# Patient Record
Sex: FEMALE | Race: OTHER | NOT HISPANIC OR LATINO
[De-identification: names, ages, dates, MRNs, and addresses within clinical notes are randomized per-mention and may not be internally consistent; named-entity substitution may affect disease eponyms.]

---

## 2020-01-30 ENCOUNTER — TRANSCRIPTION ENCOUNTER (OUTPATIENT)
Age: 50
End: 2020-01-30

## 2020-08-18 ENCOUNTER — OUTPATIENT (OUTPATIENT)
Dept: OUTPATIENT SERVICES | Facility: HOSPITAL | Age: 50
LOS: 1 days | End: 2020-08-18

## 2020-08-18 ENCOUNTER — APPOINTMENT (OUTPATIENT)
Dept: MRI IMAGING | Facility: CLINIC | Age: 50
End: 2020-08-18
Payer: COMMERCIAL

## 2020-08-18 DIAGNOSIS — Z00.8 ENCOUNTER FOR OTHER GENERAL EXAMINATION: ICD-10-CM

## 2020-08-18 PROCEDURE — 72148 MRI LUMBAR SPINE W/O DYE: CPT | Mod: 26

## 2020-12-01 ENCOUNTER — APPOINTMENT (OUTPATIENT)
Dept: ULTRASOUND IMAGING | Facility: CLINIC | Age: 50
End: 2020-12-01

## 2021-01-22 ENCOUNTER — APPOINTMENT (OUTPATIENT)
Dept: ULTRASOUND IMAGING | Facility: CLINIC | Age: 51
End: 2021-01-22

## 2021-01-22 ENCOUNTER — APPOINTMENT (OUTPATIENT)
Dept: MAMMOGRAPHY | Facility: CLINIC | Age: 51
End: 2021-01-22

## 2021-04-27 ENCOUNTER — TRANSCRIPTION ENCOUNTER (OUTPATIENT)
Age: 51
End: 2021-04-27

## 2022-05-05 ENCOUNTER — APPOINTMENT (OUTPATIENT)
Dept: ORTHOPEDIC SURGERY | Facility: CLINIC | Age: 52
End: 2022-05-05
Payer: COMMERCIAL

## 2022-05-05 PROCEDURE — L3960: CPT | Mod: RT

## 2022-05-05 PROCEDURE — 99214 OFFICE O/P EST MOD 30 MIN: CPT

## 2022-05-06 RX ORDER — OXYCODONE AND ACETAMINOPHEN 10; 325 MG/1; MG/1
10-325 TABLET ORAL EVERY 8 HOURS
Qty: 21 | Refills: 0 | Status: ACTIVE | COMMUNITY
Start: 2022-05-06 | End: 1900-01-01

## 2022-05-06 NOTE — REASON FOR VISIT
[FreeTextEntry2] : The patient has right shoulder small full thickness tear of the supraspinatus tendon

## 2022-05-06 NOTE — DISCUSSION/SUMMARY
[de-identified] : The patient has right shoulder small full thickness tear of the supraspinatus tendon.\par \par The patient has an acute and complicated. \par He has a moderate risk of morbidity secondary to pending surgery and narcotic rx.  \par \par The preoperative, intraoperative, and postoperative courses were discussed. \par Patient will need postoperative icing, bracing, and PT. \par He was prescribed Percocet for postop pain. \par Home exercises were discussed.\par The patient was given and fitted for the brace today.\par The patient will follow up 7-10 days from surgery. \par \par \par

## 2022-05-06 NOTE — PHYSICAL EXAM
[Normal Coordination] : normal coordination [Normal Sensation] : normal sensation [Normal Mood and Affect] : normal mood and affect [Orientated] : orientated [Able to Communicate] : able to communicate [Normal Skin] : normal skin [Well Developed] : well developed [Well Nourished] : well nourished [Peripheral vascular exam is grossly normal] : peripheral vascular exam is grossly normal [Right] : right shoulder [Standing] : standing [Moderate] : moderate [4 ___] : forward flexion 4[unfilled]/5 [4___] : external rotation 4[unfilled]/5 [5___] : internal rotation 5[unfilled]/5 [Left] : left shoulder [NL (0-180)] : full active abduction 0-180 degrees [NL (0-70)] : full internal rotation 0-70 degrees [NL (0-90)] : full external rotation 0-90 degrees [] : negative Gaston [TWNoteComboBox7] : active forward flexion 150 degrees [de-identified] : active abduction 90 degrees [TWNoteComboBox6] : internal rotation L4 [de-identified] : external rotation 55 degrees

## 2022-05-06 NOTE — HISTORY OF PRESENT ILLNESS
[de-identified] : The patient is here for a preoperative visit. She is relatively unchanged. PT has been minimally helpful for shoulder pain. She has difficulty performing some of her work related duties. The patient has pain at the anterior lateral shoulder. Her pain when present is intermittent in timing and minor to moderate in severity. The patient reports pain when performing certain ADL's. The patient also has pain when reaching overhead.

## 2022-05-10 RX ORDER — ONDANSETRON 8 MG/1
8 TABLET ORAL
Qty: 6 | Refills: 0 | Status: ACTIVE | COMMUNITY
Start: 2022-05-10 | End: 1900-01-01

## 2022-05-11 ENCOUNTER — APPOINTMENT (OUTPATIENT)
Dept: ORTHOPEDIC SURGERY | Facility: HOSPITAL | Age: 52
End: 2022-05-11
Payer: COMMERCIAL

## 2022-05-11 ENCOUNTER — APPOINTMENT (OUTPATIENT)
Dept: ORTHOPEDIC SURGERY | Facility: HOSPITAL | Age: 52
End: 2022-05-11

## 2022-05-11 ENCOUNTER — RESULT REVIEW (OUTPATIENT)
Age: 52
End: 2022-05-11

## 2022-05-11 PROCEDURE — 29822 SHO ARTHRS SRG LMTD DBRDMT: CPT | Mod: AS,59,RT

## 2022-05-11 PROCEDURE — 29826 SHO ARTHRS SRG DECOMPRESSION: CPT | Mod: RT

## 2022-05-11 PROCEDURE — 29827 SHO ARTHRS SRG RT8TR CUF RPR: CPT | Mod: AS,RT

## 2022-05-11 PROCEDURE — 29827 SHO ARTHRS SRG RT8TR CUF RPR: CPT | Mod: RT

## 2022-05-11 PROCEDURE — 29822 SHO ARTHRS SRG LMTD DBRDMT: CPT | Mod: 59,RT

## 2022-05-11 PROCEDURE — 29826 SHO ARTHRS SRG DECOMPRESSION: CPT | Mod: AS,RT

## 2022-05-12 ENCOUNTER — NON-APPOINTMENT (OUTPATIENT)
Age: 52
End: 2022-05-12

## 2022-05-19 ENCOUNTER — APPOINTMENT (OUTPATIENT)
Dept: ORTHOPEDIC SURGERY | Facility: CLINIC | Age: 52
End: 2022-05-19
Payer: COMMERCIAL

## 2022-05-19 PROCEDURE — 99024 POSTOP FOLLOW-UP VISIT: CPT

## 2022-05-19 NOTE — HISTORY OF PRESENT ILLNESS
[de-identified] : The patient presents in the brace and has been compliant. She reports minimal pain and no need for pain medications. Patient has been doing her HEP for the elbow wrist and hand, as well as pendulums.

## 2022-05-19 NOTE — DISCUSSION/SUMMARY
[de-identified] : The patient is s/p right shoulder arthroscopic rotator cuff repair utilizing one anchor with SAD and debridement of the superior labrum performed on 5/11/2022.\par \par The patient is doing well post operatively.\par She will continue use of the brace until 4 weeks from the date of her surgery.\par She will proceed with HEP and arm pendulums \par The patient will follow up in 3 weeks, XOA of the right shoulder, AP, Scap Y and axillary views. \par She will begin PT at that time \par \par \par I, Dr. Liang Baptiste, attest that this documentation was recorded by the scribe, in my presence, and that it accurately reflects the service I personally performed, and the decisions made by me with my edits as appropriate.\par \par I, Maynor Long, acting as scribe, attest that this documentation has been prepared under the direction and in the presence of Provider Liang Baptiste MD.\par

## 2022-05-19 NOTE — PHYSICAL EXAM
[Right] : right shoulder [de-identified] : Constitutional: The general appearance of the patient is well developed, well nourished, no deformities and well groomed. Normal\par \par  Gait: Gait and function is as follows: normal gait.\par \par  Skin: Head and neck visualized skin is normal. Left upper extremity visualized skin is normal. Right upper extremity visualized skin is normal. \par \par  Cardiovascular: palpable radial pulse bilaterally.\par \par  Neurologic: The patient is oriented to time, place and person. Sensation to light touch intact in the bilateral upper extremities. Mood and Affect is normal.\par  [] : no tenderness at bicipital groove [FreeTextEntry8] : supra not tender to palpation  [de-identified] : not tested due to recent surgery  [de-identified] : active abduction 20 degrees [de-identified] : external rotation 0 degrees

## 2022-05-19 NOTE — REASON FOR VISIT
[FreeTextEntry2] : The patient is s/p right shoulder arthroscopic rotator cuff repair utilizing one anchor with SAD and debridement of the superior labrum performed on 5/11/2022.

## 2022-06-09 ENCOUNTER — APPOINTMENT (OUTPATIENT)
Dept: ORTHOPEDIC SURGERY | Facility: CLINIC | Age: 52
End: 2022-06-09
Payer: COMMERCIAL

## 2022-06-09 PROCEDURE — 73030 X-RAY EXAM OF SHOULDER: CPT | Mod: RT

## 2022-06-09 PROCEDURE — 99024 POSTOP FOLLOW-UP VISIT: CPT

## 2022-06-09 NOTE — PHYSICAL EXAM
[de-identified] : Constitutional: The general appearance of the patient is well developed, well nourished, no deformities and well groomed. Normal\par \par  Gait: Gait and function is as follows: normal gait.\par \par  Skin: Head and neck visualized skin is normal. Left upper extremity visualized skin is normal. Right upper extremity visualized skin is normal. \par \par  Cardiovascular: palpable radial pulse bilaterally.\par \par  Neurologic: The patient is oriented to time, place and person. Sensation to light touch intact in the bilateral upper extremities. Mood and Affect is normal.\par  [] : tenderness at anterior shoulder [Right] : right shoulder [de-identified] : not tested due to recent surgery  [FreeTextEntry1] : Right shoulder: Single RCR anchor intact without movement. No fractures or loose bodies. GH joint is reduced. Adequate SAD. [TWNoteComboBox7] : active forward flexion 90 degrees [de-identified] : active abduction 90 degrees [de-identified] : external rotation 0 degrees

## 2022-06-09 NOTE — HISTORY OF PRESENT ILLNESS
[de-identified] : The patient is s/p 4 weeks from surgery. She is doing well and without pain. She reports discomfort to the lateral shoulder with small GH motion. She can reach to shoulder height in FE and ABD. She will slowly begin to use the arm for ADLs that do not require extensive lifting. She will begin outpatient PT. The patient will d/c the brace at this time as well.

## 2022-06-09 NOTE — DISCUSSION/SUMMARY
[de-identified] : The patient is s/p right shoulder arthroscopic rotator cuff repair utilizing one anchor with SAD and debridement of the superior labrum performed on 5/11/2022.\par \par The patient is doing well post operatively.\par The patient will protect her shoulder at night for one more week.\par She will d/c brace during the day. \par The patient will begin PT and was given a prescription.\par She will follow up in 4 weeks.

## 2022-06-23 ENCOUNTER — APPOINTMENT (OUTPATIENT)
Dept: ORTHOPEDIC SURGERY | Facility: CLINIC | Age: 52
End: 2022-06-23

## 2022-07-07 ENCOUNTER — APPOINTMENT (OUTPATIENT)
Dept: ORTHOPEDIC SURGERY | Facility: CLINIC | Age: 52
End: 2022-07-07

## 2022-07-07 DIAGNOSIS — M75.101 UNSPECIFIED ROTATOR CUFF TEAR OR RUPTURE OF RIGHT SHOULDER, NOT SPECIFIED AS TRAUMATIC: ICD-10-CM

## 2022-07-07 PROCEDURE — 99024 POSTOP FOLLOW-UP VISIT: CPT

## 2022-07-07 NOTE — HISTORY OF PRESENT ILLNESS
[de-identified] : The patient is 2 months post op. She is doing well, still has mild soreness in the shoulder. She is in physical therapy and seeing improvements. They have just started her on isometric exercises. She takes Aleve if needed and has continued with icing the shoulder. She states she is now having left wrist pain from overuse. She has no complaints of radiating pain, numbness, weakness, drainage, fevers, chills, chest pain or shortness of breath.

## 2022-07-07 NOTE — PHYSICAL EXAM
[Normal Coordination] : normal coordination [Normal Sensation] : normal sensation [Normal Mood and Affect] : normal mood and affect [Orientated] : orientated [Able to Communicate] : able to communicate [Normal Skin] : normal skin [No obvious lymphadenopathy in areas examined] : no obvious lymphadenopathy in areas examined [Well Developed] : well developed [Well Nourished] : well nourished [Peripheral vascular exam is grossly normal] : peripheral vascular exam is grossly normal [No Respiratory Distress] : no respiratory distress [Right] : right shoulder [Standing] : standing [4___] : external rotation 4[unfilled]/5 [5___] : internal rotation 5[unfilled]/5 [] : motor and sensory intact distally [FreeTextEntry8] : No tenderness at the AC joint, biceps tendon or supraspinatus\par  [de-identified] : 4 out of 5 supraspinatus [TWNoteComboBox7] : active forward flexion 110 degrees [de-identified] : active abduction 80 degrees [TWNoteComboBox6] : internal rotation sacrum [de-identified] : external rotation 40 degrees

## 2022-07-07 NOTE — DISCUSSION/SUMMARY
[de-identified] : The patient is s/p right shoulder arthroscopic rotator cuff repair utilizing one anchor with SAD and debridement of the superior labrum performed on 5/11/2022. \par \par The patient is doing well.\par \par She can continue with icing and NSAIDs as needed.\par A new script for PT is provided today in the office. 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 \par \par The patient will follow up in 4 weeks for re-evaluation, she may require more physical therapy if not she will be seen back as needed.

## 2022-08-04 ENCOUNTER — APPOINTMENT (OUTPATIENT)
Dept: ORTHOPEDIC SURGERY | Facility: CLINIC | Age: 52
End: 2022-08-04

## 2022-08-04 PROCEDURE — 99024 POSTOP FOLLOW-UP VISIT: CPT

## 2022-08-04 RX ORDER — CELECOXIB 200 MG/1
200 CAPSULE ORAL DAILY
Qty: 30 | Refills: 0 | Status: ACTIVE | COMMUNITY
Start: 2022-08-04 | End: 1900-01-01

## 2022-08-04 NOTE — PHYSICAL EXAM
[Right] : right shoulder [4___] : internal rotation 4[unfilled]/5 [de-identified] : Constitutional: The general appearance of the patient is well developed, well nourished, no deformities and well groomed.Normal\par  \par Gait: Gait and function is as follows:normal gait. \par  \par Skin: Head and neck visualized skin is normal.Left upper extremity visualized skin is normal.Right upper extremity visualized skin is normal.\par  \par Cardiovascular: palpable radial pulse bilaterally. \par  \par Neurologic: The patient is oriented to time, place and person.Sensation to light touch intact in the bilateral upper extremities.Mood and Affect is normal. \par \par  [] : no AC joint tenderness [FreeTextEntry8] : no supra TTP. [TWNoteComboBox7] : active forward flexion 125 degrees [de-identified] : active abduction 80 degrees [TWNoteComboBox6] : internal rotation L5 [de-identified] : external rotation 40 degrees

## 2022-08-04 NOTE — DISCUSSION/SUMMARY
[de-identified] : The patient is s/p right shoulder arthroscopic rotator cuff repair utilizing one anchor with SAD and debridement of the superior labrum performed on 5/11/2022. 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 There is a moderate risk of morbidity with further treatment, especially from use of prescription strength medications and possible side effects of these medications which include upset stomach and cardiac/renal issues with long term use were discussed. \par I recommended that the patient follow-up with their medical physician to discuss any significant specific potential issues with long term use such as interactions with current medications or with exacerbation of underlying medical morbidities.\par \par I, Dr. Liang Baptiste, attest that this documentation was recorded by the scribe, in my presence, and that it accurately reflects the service I personally performed, and the decisions made by me with my edits as appropriate.  \par \par I, Obdulio Napoles, acting as scribe, attest that this documentation has been prepared under the direction and in the presence of Provider Liang Baptiste MD.\par

## 2022-08-04 NOTE — HISTORY OF PRESENT ILLNESS
[de-identified] : The patient has been doing supervised PT. She reports some soreness. The patient is taking Aleve for her back pain. She was taking Meloxicam with no relief. The patient is using ice as needed for the pain. The patient denies paraesthesias. The patient denies new injuries or traumas. The patient denies fevers. chills, or drainage.

## 2022-08-04 NOTE — HISTORY OF PRESENT ILLNESS
[de-identified] : The patient has been doing supervised PT. She reports some soreness. The patient is taking Aleve for her back pain. She was taking Meloxicam with no relief. The patient is using ice as needed for the pain. The patient denies paraesthesias. The patient denies new injuries or traumas. The patient denies fevers. chills, or drainage.

## 2022-08-04 NOTE — PHYSICAL EXAM
[Right] : right shoulder [4___] : internal rotation 4[unfilled]/5 [de-identified] : Constitutional: The general appearance of the patient is well developed, well nourished, no deformities and well groomed.Normal\par  \par Gait: Gait and function is as follows:normal gait. \par  \par Skin: Head and neck visualized skin is normal.Left upper extremity visualized skin is normal.Right upper extremity visualized skin is normal.\par  \par Cardiovascular: palpable radial pulse bilaterally. \par  \par Neurologic: The patient is oriented to time, place and person.Sensation to light touch intact in the bilateral upper extremities.Mood and Affect is normal. \par \par  [] : no AC joint tenderness [FreeTextEntry8] : no supra TTP. [TWNoteComboBox7] : active forward flexion 125 degrees [de-identified] : active abduction 80 degrees [TWNoteComboBox6] : internal rotation L5 [de-identified] : external rotation 40 degrees

## 2022-08-04 NOTE — REASON FOR VISIT
[FreeTextEntry2] : The patient is s/p right shoulder arthroscopic rotator cuff repair utilizing one anchor with SAD and debridement of the superior labrum performed on 5/11/2022. \par

## 2022-08-04 NOTE — DISCUSSION/SUMMARY
[de-identified] : The patient is s/p right shoulder arthroscopic rotator cuff repair utilizing one anchor with SAD and debridement of the superior labrum performed on 5/11/2022. 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 There is a moderate risk of morbidity with further treatment, especially from use of prescription strength medications and possible side effects of these medications which include upset stomach and cardiac/renal issues with long term use were discussed. \par I recommended that the patient follow-up with their medical physician to discuss any significant specific potential issues with long term use such as interactions with current medications or with exacerbation of underlying medical morbidities.\par \par I, Dr. Liang Baptiste, attest that this documentation was recorded by the scribe, in my presence, and that it accurately reflects the service I personally performed, and the decisions made by me with my edits as appropriate.  \par \par I, Obdulio Napoles, acting as scribe, attest that this documentation has been prepared under the direction and in the presence of Provider Liang Baptiste MD.\par

## 2022-09-15 ENCOUNTER — APPOINTMENT (OUTPATIENT)
Dept: ORTHOPEDIC SURGERY | Facility: CLINIC | Age: 52
End: 2022-09-15

## 2022-09-15 PROCEDURE — 99213 OFFICE O/P EST LOW 20 MIN: CPT

## 2022-09-15 NOTE — DISCUSSION/SUMMARY
[de-identified] : The patient is s/p right shoulder arthroscopic rotator cuff repair utilizing one anchor with SAD and debridement of the superior labrum performed on 5/11/2022. 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 Liang Baptiste, attest that this documentation was recorded by the scribe, in my presence, and that it accurately reflects the service I personally performed, and the decisions made by me with my edits as appropriate.  \par \par I, Obdulio Napoles, acting as scribe, attest that this documentation has been prepared under the direction and in the presence of Provider Liang Baptiste MD.\par

## 2022-09-15 NOTE — HISTORY OF PRESENT ILLNESS
[de-identified] : The patient is doing well. The patient is still doing supervised PT and reports improved ROM. She reports she does still have some pain. She is able to sleep on the shoulder. She reports Celebrex was not helpful. She denies new injuries or traumas. The patient denies fevers, chills, or drainage. The patient denies paraesthesias.

## 2022-09-15 NOTE — PHYSICAL EXAM
[Right] : right shoulder [4___] : external rotation 4[unfilled]/5 [5___] : internal rotation 5[unfilled]/5 [de-identified] : Constitutional: The general appearance of the patient is well developed, well nourished, no deformities and well groomed.Normal\par  \par Gait: Gait and function is as follows:normal gait. \par  \par Skin: Head and neck visualized skin is normal.Left upper extremity visualized skin is normal.Right upper extremity visualized skin is normal.\par  \par Cardiovascular: palpable radial pulse bilaterally. \par  \par Neurologic: The patient is oriented to time, place and person.Sensation to light touch intact in the bilateral upper extremities.Mood and Affect is normal. \par \par  [] : no tenderness at bicipital groove [FreeTextEntry8] : supra TTP. [FreeTextEntry9] : +Neer. [de-identified] : supra 4/5. [TWNoteComboBox7] : active forward flexion 160 degrees [de-identified] : active abduction 90 degrees [TWNoteComboBox6] : internal rotation L3 [de-identified] : external rotation 50 degrees

## 2022-10-27 ENCOUNTER — APPOINTMENT (OUTPATIENT)
Dept: ORTHOPEDIC SURGERY | Facility: CLINIC | Age: 52
End: 2022-10-27

## 2022-10-27 DIAGNOSIS — M25.511 PAIN IN RIGHT SHOULDER: ICD-10-CM

## 2022-10-27 DIAGNOSIS — Z98.890 OTHER SPECIFIED POSTPROCEDURAL STATES: ICD-10-CM

## 2022-10-27 PROCEDURE — 99213 OFFICE O/P EST LOW 20 MIN: CPT

## 2022-10-27 NOTE — DISCUSSION/SUMMARY
[de-identified] : The patient is s/p right shoulder arthroscopic rotator cuff repair utilizing one anchor with SAD and debridement of the superior labrum performed on 5/11/2022.\par \par The patient was seen by Dr. Baptiste today. \par She will continue with PT and was given an new rx.\par Physical therapy at this time is medically necessary in order to improve patient's strength.\par She will follow up with Dr. Negro as needed.\par \par

## 2022-10-27 NOTE — PHYSICAL EXAM
[Right] : right shoulder [5 ___] : forward flexion 5[unfilled]/5 [4___] : external rotation 4[unfilled]/5 [5___] : internal rotation 5[unfilled]/5 [de-identified] : Constitutional: The general appearance of the patient is well developed, well nourished, no deformities and well groomed.Normal\par  \par Gait: Gait and function is as follows:normal gait. \par  \par Skin: Head and neck visualized skin is normal.Left upper extremity visualized skin is normal.Right upper extremity visualized skin is normal.\par  \par Cardiovascular: palpable radial pulse bilaterally. \par  \par Neurologic: The patient is oriented to time, place and person.Sensation to light touch intact in the bilateral upper extremities.Mood and Affect is normal. \par \par  [] : no tenderness at bicipital groove [FreeTextEntry8] : supra TTP. [FreeTextEntry9] : +Neer. [TWNoteComboBox7] : active forward flexion 160 degrees [de-identified] : active abduction 90 degrees [TWNoteComboBox6] : internal rotation L3 [de-identified] : external rotation 50 degrees

## 2022-10-27 NOTE — HISTORY OF PRESENT ILLNESS
[de-identified] : The patient is s/p approx. 6 months from surgery. The patient is still doing supervised PT and reports improved ROM. She reports she does still have some pain. She is able to sleep on the shoulder. She reports Celebrex was not helpful. She denies new injuries or traumas. The patient denies fevers, chills, or drainage. The patient denies paraesthesias. The patient wishes to continue PT to work on strengthening.

## 2022-11-07 ENCOUNTER — APPOINTMENT (OUTPATIENT)
Dept: ORTHOPEDIC SURGERY | Facility: CLINIC | Age: 52
End: 2022-11-07

## 2022-11-07 VITALS — BODY MASS INDEX: 26.58 KG/M2 | HEIGHT: 63 IN | WEIGHT: 150 LBS

## 2022-11-07 DIAGNOSIS — Z78.9 OTHER SPECIFIED HEALTH STATUS: ICD-10-CM

## 2022-11-07 PROCEDURE — 99213 OFFICE O/P EST LOW 20 MIN: CPT | Mod: 25

## 2022-11-07 PROCEDURE — 20550 NJX 1 TENDON SHEATH/LIGAMENT: CPT

## 2022-11-07 NOTE — PROCEDURE
[Tendon Sheath] : tendon sheath [Left] : of the left [De Lavon's] : avani suggs's [Pain] : pain [Inflammation] : inflammation [Alcohol] : alcohol [Ethyl Chloride sprayed topically] : ethyl chloride sprayed topically [Sterile technique used] : sterile technique used [___ cc    1%] : Lidocaine ~Vcc of 1%  [___ cc    10mg] : Triamcinolone (Kenalog) ~Vcc of 10 mg  [Risks, benefits, alternatives discussed / Verbal consent obtained] : the risks benefits, and alternatives have been discussed, and verbal consent was obtained

## 2022-11-07 NOTE — HISTORY OF PRESENT ILLNESS
[de-identified] : The patient is a 52 year female who presents today complaining of left wrist pain\par Date of Injury/Onset:chronic\par Pain:    At Rest: 6/10 \par With Activity:  7/10 \par Mechanism of injury: \par Quality of symptoms: sharp, shooting\par Improves with: nothing\par Worse with: bending or at rest\par Prior treatment: brace, injections in the past\par Prior Imaging: no\par Additional Information: \par \par 52 year old female followed for LT De Quervain's. Last CSI 1 year ago.

## 2023-03-02 ENCOUNTER — APPOINTMENT (OUTPATIENT)
Dept: ORTHOPEDIC SURGERY | Facility: CLINIC | Age: 53
End: 2023-03-02
Payer: COMMERCIAL

## 2023-03-02 VITALS — HEIGHT: 63 IN | BODY MASS INDEX: 26.58 KG/M2 | WEIGHT: 150 LBS

## 2023-03-02 PROCEDURE — 99213 OFFICE O/P EST LOW 20 MIN: CPT

## 2023-03-02 NOTE — HISTORY OF PRESENT ILLNESS
[9] : 9 [0] : 0 [Sharp] : sharp [Rest] : rest [de-identified] : 52 year old female presenting with RIGHT elbow pain. She has had this pain for years but with recent reoccurrence.  [] : no [FreeTextEntry1] : Right elbow  [FreeTextEntry3] : 3 weeks ago  [FreeTextEntry5] : pt states no injury has occurred  [FreeTextEntry7] : forearm  [de-identified] : activity

## 2023-03-02 NOTE — DISCUSSION/SUMMARY
[de-identified] : Discussed the nature of the diagnosis and risk and benefits of different modalities of treatment.\par Discussed the likelihood of symptoms resolving with time. \par Discussed a modified lifting technique.\par Start PT and use of tennis elbow strap.\par RTO 4 weeks.\par

## 2023-04-13 ENCOUNTER — APPOINTMENT (OUTPATIENT)
Dept: ORTHOPEDIC SURGERY | Facility: CLINIC | Age: 53
End: 2023-04-13
Payer: COMMERCIAL

## 2023-04-13 VITALS — HEIGHT: 63 IN | BODY MASS INDEX: 26.58 KG/M2 | WEIGHT: 150 LBS

## 2023-04-13 PROCEDURE — 99213 OFFICE O/P EST LOW 20 MIN: CPT

## 2023-04-13 NOTE — HISTORY OF PRESENT ILLNESS
[8] : 8 [4] : 4 [Dull/Aching] : dull/aching [Radiating] : radiating [Sharp] : sharp [de-identified] : 52 year old female followed for RT elbow lateral epicondylitis. She is  attending PT and was noticing some improvement but then her pain worsened after doing yard work. She is also using an elbow strap/  [] : no [FreeTextEntry1] : right elbow [FreeTextEntry9] : tennis elbow strap  [de-identified] : lifting, carrying  [de-identified] : PT 2x a week  [de-identified] : Not working

## 2023-04-13 NOTE — DISCUSSION/SUMMARY
[de-identified] : Discussed the nature of the diagnosis and risk and benefits of different modalities of treatment.\par Again discussed the expectation for pain to resolve with time. \par She will revisit therapy. \par RTO 4 weeks.

## 2023-05-15 ENCOUNTER — APPOINTMENT (OUTPATIENT)
Dept: ORTHOPEDIC SURGERY | Facility: CLINIC | Age: 53
End: 2023-05-15

## 2023-06-08 ENCOUNTER — APPOINTMENT (OUTPATIENT)
Dept: ORTHOPEDIC SURGERY | Facility: CLINIC | Age: 53
End: 2023-06-08
Payer: COMMERCIAL

## 2023-06-08 VITALS — BODY MASS INDEX: 26.58 KG/M2 | HEIGHT: 63 IN | WEIGHT: 150 LBS

## 2023-06-08 DIAGNOSIS — M77.11 LATERAL EPICONDYLITIS, RIGHT ELBOW: ICD-10-CM

## 2023-06-08 PROCEDURE — 99213 OFFICE O/P EST LOW 20 MIN: CPT

## 2023-06-08 NOTE — DISCUSSION/SUMMARY
[de-identified] : Will do HEP for RIGHT elbow\par RIGHT wrist.  Patient wishes to continue conservative treatment.  She will call if she fails to continue to improve. \par

## 2023-06-08 NOTE — HISTORY OF PRESENT ILLNESS
[Dull/Aching] : dull/aching [Sharp] : sharp [Ice] : ice [8] : 8 [4] : 4 [de-identified] : 52 year old female followed for RIGHT elbow lateral epicondylitis.  SHe has been attending PT and states she is significantly better than before.  She is still with some pain, depending on activity.  She is pleased with per progress.  She is also followed for LEFT de Quervain's.  Sh ewas injected 7 months ago and had good relief.  She feels there is recent recurrence [FreeTextEntry1] : right elbow  [FreeTextEntry5] : pt states is feeling pain on left wrist  [de-identified] : overuse  [de-identified] : pt

## 2023-06-08 NOTE — PHYSICAL EXAM
[Right] : right elbow [Left] : left hand [] : no erythema [FreeTextEntry3] : 1st dorsal compartment tenderness.  + Jemal's

## 2024-01-04 ENCOUNTER — NON-APPOINTMENT (OUTPATIENT)
Age: 54
End: 2024-01-04

## 2024-01-09 PROBLEM — Z00.00 ENCOUNTER FOR PREVENTIVE HEALTH EXAMINATION: Status: ACTIVE | Noted: 2020-08-12

## 2024-01-16 ENCOUNTER — APPOINTMENT (OUTPATIENT)
Dept: OBGYN | Facility: CLINIC | Age: 54
End: 2024-01-16
Payer: COMMERCIAL

## 2024-01-16 DIAGNOSIS — Z00.00 ENCOUNTER FOR GENERAL ADULT MEDICAL EXAMINATION W/OUT ABNORMAL FINDINGS: ICD-10-CM

## 2024-01-30 ENCOUNTER — APPOINTMENT (OUTPATIENT)
Dept: ORTHOPEDIC SURGERY | Facility: CLINIC | Age: 54
End: 2024-01-30
Payer: COMMERCIAL

## 2024-01-30 VITALS — HEIGHT: 63 IN | BODY MASS INDEX: 26.58 KG/M2 | WEIGHT: 150 LBS

## 2024-01-30 DIAGNOSIS — C18.9 MALIGNANT NEOPLASM OF COLON, UNSPECIFIED: ICD-10-CM

## 2024-01-30 DIAGNOSIS — Z12.4 ENCOUNTER FOR SCREENING FOR MALIGNANT NEOPLASM OF CERVIX: ICD-10-CM

## 2024-01-30 PROCEDURE — 99214 OFFICE O/P EST MOD 30 MIN: CPT

## 2024-01-30 PROCEDURE — 73030 X-RAY EXAM OF SHOULDER: CPT | Mod: LT

## 2024-01-30 NOTE — HISTORY OF PRESENT ILLNESS
[Gradual] : gradual [9] : 9 [5] : 5 [Dull/Aching] : dull/aching [Localized] : localized [Sharp] : sharp [Intermittent] : intermittent [Sleep] : sleep [Ice] : ice [Lying in bed] : lying in bed [de-identified] : Patient c/o left shoulder pain for the past 3 weeks. No injury or trauma. Pain is aggravated with opening the refrigerator door and using her arm. She was taking motrin for her back with some relief. She is s/p right shoulder rcr 5/2022 by Dr. Villatoro. No previous left shoulder issues.  [] : no [FreeTextEntry1] : l shoulder [FreeTextEntry5] : shoulder pain for 3 weeks, no recent injury  [FreeTextEntry9] : tales Aleve [de-identified] : certain movements [de-identified] : not working

## 2024-01-30 NOTE — DISCUSSION/SUMMARY
[de-identified] : General Dx Discussion The patient was advised of the diagnosis. The natural history of the pathology was explained in full to the patient in layman's terms. All questions were answered. The risks and benefits of surgical and non-surgical treatment alternatives were explained in full to the patient.  Case Discussed. Given history of R RCT will get an mri left shoulder for further evaluatin of RCT. Offered patient a CSI today, but she declines.  Also offered her Mobic, but she declines.  She will take aleve as needed.  f/u after mri.   Entered by Mary IGNACIO acting as a scribe. Instructions: Dr. Caicedo- The documentation recorded by the scribe accurately reflects the service I personally performed and the decisions made by me.

## 2024-01-30 NOTE — PHYSICAL EXAM
[Sitting] : sitting [Mild] : mild [Left] : left shoulder [] : no erythema [5 ___] : forward flexion 5[unfilled]/5 [5___] : internal rotation 5[unfilled]/5 [TWNoteComboBox7] : active forward flexion 160 degrees [de-identified] : active abduction 140 degrees [TWNoteComboBox6] : internal rotation L4 [de-identified] : external rotation 65 degrees

## 2024-02-06 ENCOUNTER — RESULT CHARGE (OUTPATIENT)
Age: 54
End: 2024-02-06

## 2024-02-06 ENCOUNTER — APPOINTMENT (OUTPATIENT)
Dept: OBGYN | Facility: CLINIC | Age: 54
End: 2024-02-06
Payer: COMMERCIAL

## 2024-02-06 VITALS
SYSTOLIC BLOOD PRESSURE: 134 MMHG | HEIGHT: 63 IN | DIASTOLIC BLOOD PRESSURE: 73 MMHG | WEIGHT: 150 LBS | BODY MASS INDEX: 26.58 KG/M2 | HEART RATE: 93 BPM

## 2024-02-06 LAB
BILIRUB UR QL STRIP: NORMAL
CLARITY UR: CLEAR
COLLECTION METHOD: NORMAL
GLUCOSE UR-MCNC: NORMAL
HCG UR QL: 0.2 EU/DL
HEMOGLOBIN: 14.1
HGB UR QL STRIP.AUTO: NORMAL
KETONES UR-MCNC: NORMAL
LEUKOCYTE ESTERASE UR QL STRIP: NORMAL
NITRITE UR QL STRIP: NORMAL
PH UR STRIP: 6
PROT UR STRIP-MCNC: NORMAL
SP GR UR STRIP: 1.02

## 2024-02-06 PROCEDURE — 85018 HEMOGLOBIN: CPT | Mod: QW

## 2024-02-06 PROCEDURE — 99386 PREV VISIT NEW AGE 40-64: CPT | Mod: 25

## 2024-02-06 PROCEDURE — 82270 OCCULT BLOOD FECES: CPT

## 2024-02-06 PROCEDURE — 81003 URINALYSIS AUTO W/O SCOPE: CPT | Mod: QW

## 2024-02-06 NOTE — HISTORY OF PRESENT ILLNESS
[Patient reported mammogram was normal] : Patient reported mammogram was normal [Patient reported breast sonogram was normal] : Patient reported breast sonogram was normal [Patient reported PAP Smear was normal] : Patient reported PAP Smear was normal [Patient reported colonoscopy was abnormal] : Patient reported colonoscopy was abnormal [No] : Patient does not have concerns regarding sex [Previously active] : previously active [Patient refuses STI testing] : Patient refuses STI testing [FreeTextEntry1] : Patient presents today for routine annual visit. She has no GYN concerns or complaints.     [Mammogramdate] : 01/4/24 [BreastSonogramDate] : 01/4/24 [PapSmeardate] : 12/2021 [ColonoscopyDate] : 2021 [TextBox_43] : polyp, come back 3-4, due now. [LMPDate] : 01/15/24 [PGHxTotal] : 0

## 2024-02-06 NOTE — PHYSICAL EXAM
[Chaperone Declined] : Patient declined chaperone [Appropriately responsive] : appropriately responsive [Alert] : alert [No Acute Distress] : no acute distress [Regular Rate Rhythm] : regular rate rhythm [No Lymphadenopathy] : no lymphadenopathy [No Murmurs] : no murmurs [Clear to Auscultation B/L] : clear to auscultation bilaterally [Soft] : soft [Non-tender] : non-tender [Non-distended] : non-distended [No HSM] : No HSM [No Lesions] : no lesions [No Mass] : no mass [Oriented x3] : oriented x3 [Examination Of The Breasts] : a normal appearance [No Masses] : no breast masses were palpable [Labia Majora] : normal [Labia Minora] : normal [Normal] : normal [Uterine Adnexae] : normal

## 2024-02-06 NOTE — PLAN
[FreeTextEntry1] : PCP for physical, GI for colonoscopy- both due now.  RTO in 1 year or sooner PRN.

## 2024-02-13 ENCOUNTER — APPOINTMENT (OUTPATIENT)
Dept: ORTHOPEDIC SURGERY | Facility: CLINIC | Age: 54
End: 2024-02-13

## 2024-02-20 LAB — CYTOLOGY CVX/VAG DOC THIN PREP: NORMAL

## 2024-02-22 ENCOUNTER — RESULT REVIEW (OUTPATIENT)
Age: 54
End: 2024-02-22

## 2024-03-05 ENCOUNTER — APPOINTMENT (OUTPATIENT)
Dept: ORTHOPEDIC SURGERY | Facility: CLINIC | Age: 54
End: 2024-03-05
Payer: COMMERCIAL

## 2024-03-05 VITALS — BODY MASS INDEX: 26.58 KG/M2 | HEIGHT: 63 IN | WEIGHT: 150 LBS

## 2024-03-05 DIAGNOSIS — M75.112 INCOMPLETE ROTATOR CUFF TEAR OR RUPTURE OF LEFT SHOULDER, NOT SPECIFIED AS TRAUMATIC: ICD-10-CM

## 2024-03-05 PROCEDURE — 99213 OFFICE O/P EST LOW 20 MIN: CPT

## 2024-03-06 NOTE — DATA REVIEWED
[MRI] : MRI [Left] : left [Shoulder] : shoulder [Report was reviewed and noted in the chart] : The report was reviewed and noted in the chart [I reviewed the films/CD] : I reviewed the films/CD [FreeTextEntry1] : high grade partial RCT looks worse on images as compared to mri report

## 2024-03-06 NOTE — PHYSICAL EXAM
[Left] : left shoulder [Sitting] : sitting [Mild] : mild [5 ___] : forward flexion 5[unfilled]/5 [5___] : external rotation 5[unfilled]/5 [] : no erythema [TWNoteComboBox7] : active forward flexion 160 degrees [de-identified] : active abduction 140 degrees [TWNoteComboBox6] : internal rotation L4 [de-identified] : external rotation 65 degrees

## 2024-03-07 ENCOUNTER — APPOINTMENT (OUTPATIENT)
Dept: ORTHOPEDIC SURGERY | Facility: CLINIC | Age: 54
End: 2024-03-07
Payer: COMMERCIAL

## 2024-03-07 VITALS — BODY MASS INDEX: 26.58 KG/M2 | WEIGHT: 150 LBS | HEIGHT: 63 IN

## 2024-03-07 PROCEDURE — 99213 OFFICE O/P EST LOW 20 MIN: CPT

## 2024-03-07 NOTE — PHYSICAL EXAM
[Right] : right hand [Left] : left hand [First Dorsal Compartment] : first dorsal compartment [] : no erythema [de-identified] : thumb abduction and FDI 5/5 b/l  [de-identified] : mild positive

## 2024-03-07 NOTE — DISCUSSION/SUMMARY
[de-identified] : Discussed the nature of the diagnosis and risk and benefits of different modalities of treatment. b/l CTS and LT De Quervain's tenosynovitis.  Will obtain an EMG to eval for CTS. She would like to manage De Quervain's tenosynovitis conservatively.  Warm compress and NSAIDs.  RTO after EMG.

## 2024-03-07 NOTE — HISTORY OF PRESENT ILLNESS
[3] : 3 [8] : 8 [Sharp] : sharp [Dull/Aching] : dull/aching [Meds] : meds [de-identified] : 53 year old female with bilateral morning tingling in fingers.  Also with radial sided wrist pain bilaterally.  She has been followed for LEFT de Quervain's.  Has been splinting some nights and some days.  Worst pain is radial RIGHT wrist.  [] : no [FreeTextEntry5] : NKI [FreeTextEntry1] : BL wrists, RT wrist> LT [FreeTextEntry6] : Weakness [FreeTextEntry9] : Braces at night  [de-identified] : activity [de-identified] : [facility] - 11/2022

## 2024-03-25 ENCOUNTER — APPOINTMENT (OUTPATIENT)
Dept: ORTHOPEDIC SURGERY | Facility: CLINIC | Age: 54
End: 2024-03-25
Payer: COMMERCIAL

## 2024-03-25 VITALS — WEIGHT: 150 LBS | BODY MASS INDEX: 25.61 KG/M2 | HEIGHT: 64 IN

## 2024-03-25 DIAGNOSIS — M54.12 RADICULOPATHY, CERVICAL REGION: ICD-10-CM

## 2024-03-25 PROCEDURE — 99204 OFFICE O/P NEW MOD 45 MIN: CPT | Mod: 25

## 2024-03-25 PROCEDURE — 20611 DRAIN/INJ JOINT/BURSA W/US: CPT | Mod: LT

## 2024-03-25 PROCEDURE — J3490M: CUSTOM

## 2024-03-25 NOTE — DISCUSSION/SUMMARY
[de-identified] : Assessment:   The patient presents with history, examination and imaging that are most consistent with a diagnosis of:  Nontraumatic incomplete rotator cuff tear of left shoulder   The patient would like to pursue conservative measures at this time. After consideration of various non-operative treatment modalities, the patient would like to proceed with the modalities listed below.   During this appointment the patient was examined, diagnoses were discussed and explained in a face to face manner. In addition extensive time was spent reviewing aforementioned diagnostic studies. Counseling including abnormal image results, differential diagnoses, treatment options, risk and benefits, lifestyle changes, current condition, and current medications was performed. Patient's comments, questions, and concerns were address and patient verbalized understanding.   ---------------------------     Plan: - Discussed nonoperative treatment vs operative treatment options. Patient would like to proceed with nonoperative management at this time.  - Continue PT, script provided - Warm compress and ice  - CSI of left shoulder today - jamil well - Considering weakness in hands, discussed with patient that this is may be a cervical issue. MRI ordered of c-spine to eval for HNP. Patient will follow up with spine specialist after obtaining.     Follow-up:  6-8 weeks

## 2024-03-25 NOTE — HISTORY OF PRESENT ILLNESS
[de-identified] : The patient is a 53 year  old right hand dominant F who presents today complaining of left shoulder.   Date of Injury/Onset: since January 2024 Pain:    At Rest: 9/10  With Activity:  8/10  Quality of symptoms: sharp, aching, radiates to neck Improves with: ice, alieve Worse with: sitting, laying in bed, lifting and pulling Prior treatment: none Prior Imaging: MRI @[facility] Out of work: since 2022 Additional Information: Patient is looking for a second opinion

## 2024-03-25 NOTE — IMAGING
[de-identified] : The patient is a well appearing 53 year year old female of their stated age. Neck is supple & nontender to palpation. Negative Spurling's test.   General: in no acute distress, seated comfortably, moving easily Skin: No discoloration, rashes; on palpation skin is dry, Neuro: Normal sensation all dermatomes, motor all myotomes Vascular: Normal pulses, no edema, normal temperature Coordination and balance: Normal Psych: normal mood and affect, non pressured speech, alert and oriented x3   LEFT Shoulder Inspection: Scapula Winging: Negative Deformity: None Erythema: None Ecchymosis: None Abrasions: None Effusion: None   Range of Motion: Active Forward Flexion: 160 degrees Passive Forward Flexion: 170 degrees Active IR : L4 Passive ER : 30 degrees   Motor Exam: Forward Flexion: 4+ out of 5 Flexion Plane of Scapula: 5 out of 5 Abduction: 4+ out of 5 Internal Rotation: 5 out of 5 External Rotation: 4+ out of 5 Distal Motor Strength: 5 out of 5   Stability Testing: Anterior: 1+ Posterior: 1+ Sulcus N: 1+ Sulcus ER: 1+   Provocative Tests: Drop Arm: Negative Foster/Impingement: Positive [place]: Positive X-Arm Adduction: Negative Belly Press: Negative Bear Hug: Negative Lift Off: Negative Apprehension: Negative Relocation: Negative Posterior Load & Shift: Negative   Palpation: AC Joint: TTP Clavicle: Nontender SC Joint: Nontender Bicipital Groove: TTP Coracoid Process: Nontender Pectoralis Minor Tendon: Nontender Pectoralis Major Tendon: Nontender & palpably intact Latissimus Dorsi: Nontender Proximal Humerus: Nontender Scapula Body: Nontender Medial Scapula Border: Nontender Scapula Spine: Nontender   Neurologic Exam: Sensation to Light Touch: Axillary: Grossly intact Ulnar: Grossly intact Radial: Grossly intact Median: Grossly intact   Circulatory/Pulses: Ulnar: 2+ Radial: 2+ CR < 2s  X-RAYS of the LEFT shoulder (AP, SCAPULAR Y, AND AXILLARY VIEWS): no fracture or dislocation; Bigliani Type 2 acromion; mild degenerative change at ACJ; subacromial spurring

## 2024-03-25 NOTE — DATA REVIEWED
[MRI] : MRI [Left] : left [Shoulder] : shoulder [Report was reviewed and noted in the chart] : The report was reviewed and noted in the chart [I independently reviewed and interpreted images and report] : I independently reviewed and interpreted images and report [I reviewed the films/CD] : I reviewed the films/CD [FreeTextEntry1] : Mary Ann 2/22/24 (KEVIN): 1. Supraspinatus tendinosis with intrasubstance delaminating tearing at its insertion. No evidence of full-thickness rotator cuff tendon tears. 2. Thickening of the joint capsule at the level of the axillary recess. This finding may be secondary to a nonacute injury or the presence of an element of adhesive capsulitis.

## 2024-03-25 NOTE — PROCEDURE
[FreeTextEntry3] : Patient Identification  Name/: Verbal with patient and/or family    Procedure Verification:  Procedure confirmed with patient or family/designee  Consent for procedure: Verbal Consent Given  Relevant documentation completed, reviewed, and signed  Clinical indications for procedure confirmed    Time-out with all members of procedure team immediately prior to procedure:  Correct patient identified. Agreement on procedure. Correct side and site.    ULTRASOUND GUIDED SHOULDER SUBACROMIAL INJECTION - LEFT  After verbal consent and identification of the correct patient and correct site, the posterior right shoulder was prepped using alcohol swabs and betadine. This was allowed time to air dry. After ethyl chloride spray for skin anesthesia, a mixture of 1cc Celestone 6mg/ml, 3cc Lidocaine 1%, and 3cc Bupivacaine 0.5% was injected under ultrasound guidance into the subacromial space from posterior using a sterile 22G needle. Visualization of the needle and placement of the injection was performed without any complications. Ultrasound was used for visualization, precise injection in area of tear, and / or prior failure or difficult injection. The patient tolerated the procedure well. After-care instructions were provided and included instructions to ice the area and to call if redness, pain, or fever develop.

## 2024-04-01 ENCOUNTER — APPOINTMENT (OUTPATIENT)
Dept: NEUROLOGY | Facility: CLINIC | Age: 54
End: 2024-04-01
Payer: COMMERCIAL

## 2024-04-01 PROCEDURE — 95886 MUSC TEST DONE W/N TEST COMP: CPT

## 2024-04-01 PROCEDURE — 95912 NRV CNDJ TEST 11-12 STUDIES: CPT

## 2024-04-02 ENCOUNTER — RESULT REVIEW (OUTPATIENT)
Age: 54
End: 2024-04-02

## 2024-04-11 ENCOUNTER — APPOINTMENT (OUTPATIENT)
Dept: ORTHOPEDIC SURGERY | Facility: CLINIC | Age: 54
End: 2024-04-11
Payer: COMMERCIAL

## 2024-04-11 PROCEDURE — 20526 THER INJECTION CARP TUNNEL: CPT | Mod: LT

## 2024-04-11 PROCEDURE — 99213 OFFICE O/P EST LOW 20 MIN: CPT | Mod: 25

## 2024-04-11 NOTE — PROCEDURE
[Carpal Tunnel] : carpal tunnel [Left] : of the left [Pain] : pain [Inflammation] : inflammation [Alcohol] : alcohol [Ethyl Chloride sprayed topically] : ethyl chloride sprayed topically [Sterile technique used] : sterile technique used [___ cc    10mg] : Triamcinolone (Kenalog) ~Vcc of 10 mg  [] : Patient tolerated procedure well [Risks, benefits, alternatives discussed / Verbal consent obtained] : the risks benefits, and alternatives have been discussed, and verbal consent was obtained

## 2024-04-11 NOTE — HISTORY OF PRESENT ILLNESS
[8] : 8 [3] : 3 [Dull/Aching] : dull/aching [Sharp] : sharp [Meds] : meds [de-identified] : 53 year old female with bilateral  CTS and LEFT de Quervain's.  She returns after electrical study, which shows signs of RIGHT CTS and LEFT and right cervical radiculopathy.  Numbness is most present i the AM. [] : no [FreeTextEntry1] : BL wrists, RT wrist> LT [FreeTextEntry5] : NKI [FreeTextEntry6] : Weakness [FreeTextEntry9] : Braces at night  [de-identified] : EMG with Dr. Reid  [de-identified] : activity

## 2024-04-11 NOTE — PHYSICAL EXAM
[Right] : right hand [Left] : left hand [First Dorsal Compartment] : first dorsal compartment [] : no erythema [de-identified] : thumb abduction and FDI 5/5 b/l  [de-identified] : mild positive

## 2024-04-11 NOTE — DISCUSSION/SUMMARY
[de-identified] : Discussed that she is meeting criteria for RIGHT CTR.  She is not certain she is ready to move forward with RIGHT CTR.  Will inject LEFT CTS today as diagnostic aide.  She will RTO 4 weeks

## 2024-04-15 ENCOUNTER — APPOINTMENT (OUTPATIENT)
Dept: ORTHOPEDIC SURGERY | Facility: CLINIC | Age: 54
End: 2024-04-15
Payer: COMMERCIAL

## 2024-04-15 PROCEDURE — 99214 OFFICE O/P EST MOD 30 MIN: CPT

## 2024-04-22 NOTE — DATA REVIEWED
[MRI] : MRI [Cervical Spine] : cervical spine [Report was reviewed and noted in the chart] : The report was reviewed and noted in the chart [I independently reviewed and interpreted images and report] : I independently reviewed and interpreted images and report [I reviewed the films/CD] : I reviewed the films/CD [FreeTextEntry1] : Mary Ann 4/2/24:  Multilevel degenerative change most notable for moderate spinal canal stenosis at C5-C6

## 2024-04-22 NOTE — DISCUSSION/SUMMARY
[de-identified] : Assessment:   The patient presents with history, examination and imaging that are most consistent with a diagnosis of:  Nontraumatic incomplete rotator cuff tear of left shoulder, cervical spine stenosis   The patient would like to pursue conservative measures at this time. After consideration of various non-operative treatment modalities, the patient would like to proceed with the modalities listed below.   During this appointment the patient was examined, diagnoses were discussed and explained in a face to face manner. In addition extensive time was spent reviewing aforementioned diagnostic studies. Counseling including abnormal image results, differential diagnoses, treatment options, risk and benefits, lifestyle changes, current condition, and current medications was performed. Patient's comments, questions, and concerns were address and patient verbalized understanding.   ---------------------------     Plan: - Reviewed and discussed MRI with patient. Discussed probable diagnosis. Advised patient follow up with spine specialist for further treatment - Continue PT for left shoulder, script provided - Warm compress and ice     Follow-up:  3 months REGARDING SHOULDER

## 2024-04-22 NOTE — IMAGING
[de-identified] : The patient is a well appearing 53 year year old female of their stated age. Neck is supple & nontender to palpation. POSITIVE Spurling's test.   General: in no acute distress, seated comfortably, moving easily Skin: No discoloration, rashes; on palpation skin is dry, Neuro: Normal sensation all dermatomes, motor all myotomes Vascular: Normal pulses, no edema, normal temperature Coordination and balance: Normal Psych: normal mood and affect, non pressured speech, alert and oriented x3   LEFT Shoulder Inspection: Scapula Winging: Negative Deformity: None Erythema: None Ecchymosis: None Abrasions: None Effusion: None   Range of Motion: Active Forward Flexion: 160 degrees Passive Forward Flexion: 170 degrees Active IR : L4 Passive ER : 30 degrees   Motor Exam: Forward Flexion: 4+ out of 5 Flexion Plane of Scapula: 5 out of 5 Abduction: 4+ out of 5 Internal Rotation: 5 out of 5 External Rotation: 4+ out of 5 Distal Motor Strength: 5 out of 5   Stability Testing: Anterior: 1+ Posterior: 1+ Sulcus N: 1+ Sulcus ER: 1+   Provocative Tests: Drop Arm: Negative Foster/Impingement: Positive [place]: Positive X-Arm Adduction: Negative Belly Press: Negative Bear Hug: Negative Lift Off: Negative Apprehension: Negative Relocation: Negative Posterior Load & Shift: Negative   Palpation: AC Joint: TTP Clavicle: Nontender SC Joint: Nontender Bicipital Groove: TTP Coracoid Process: Nontender Pectoralis Minor Tendon: Nontender Pectoralis Major Tendon: Nontender & palpably intact Latissimus Dorsi: Nontender Proximal Humerus: Nontender Scapula Body: Nontender Medial Scapula Border: Nontender Scapula Spine: Nontender   Neurologic Exam: Sensation to Light Touch: Axillary: Grossly intact Ulnar: Grossly intact Radial: Grossly intact Median: Grossly intact   Circulatory/Pulses: Ulnar: 2+ Radial: 2+ CR < 2s  X-RAYS of the LEFT shoulder (AP, SCAPULAR Y, AND AXILLARY VIEWS): no fracture or dislocation; Bigliani Type 2 acromion; mild degenerative change at ACJ; subacromial spurring

## 2024-04-22 NOTE — HISTORY OF PRESENT ILLNESS
[de-identified] : 4/15/24: Patient here to review cspine MRI results   The patient is a 53 year  old right hand dominant F who presents today complaining of left shoulder.   Date of Injury/Onset: since January 2024 Pain:    At Rest: 9/10  With Activity:  8/10  Quality of symptoms: sharp, aching, radiates to neck Improves with: ice, alieve Worse with: sitting, laying in bed, lifting and pulling Prior treatment: none Prior Imaging: MRI @[facility] Out of work: since 2022 Additional Information: Patient is looking for a second opinion

## 2024-05-09 ENCOUNTER — APPOINTMENT (OUTPATIENT)
Dept: ORTHOPEDIC SURGERY | Facility: CLINIC | Age: 54
End: 2024-05-09
Payer: COMMERCIAL

## 2024-05-09 VITALS — HEIGHT: 64 IN | WEIGHT: 150 LBS | BODY MASS INDEX: 25.61 KG/M2

## 2024-05-09 DIAGNOSIS — M65.4 RADIAL STYLOID TENOSYNOVITIS [DE QUERVAIN]: ICD-10-CM

## 2024-05-09 DIAGNOSIS — G56.01 CARPAL TUNNEL SYNDROME, RIGHT UPPER LIMB: ICD-10-CM

## 2024-05-09 DIAGNOSIS — G56.02 CARPAL TUNNEL SYNDROME, LEFT UPPER LIMB: ICD-10-CM

## 2024-05-09 PROCEDURE — 99213 OFFICE O/P EST LOW 20 MIN: CPT

## 2024-05-09 NOTE — DISCUSSION/SUMMARY
[de-identified] : Discussed the nature of the diagnosis and risk and benefits of different modalities of treatment. She is improved after LEFT Carpal tunnel injection. Symptoms on the right are tolerable at this time. Will plan for LT CTR when her symptoms return.  PRN.

## 2024-05-09 NOTE — PHYSICAL EXAM
[Right] : right hand [Left] : left hand [First Dorsal Compartment] : first dorsal compartment [] : no erythema [de-identified] : thumb abduction and FDI 5/5 b/l  [de-identified] : mild positive

## 2024-05-10 ENCOUNTER — APPOINTMENT (OUTPATIENT)
Dept: ORTHOPEDIC SURGERY | Facility: CLINIC | Age: 54
End: 2024-05-10
Payer: COMMERCIAL

## 2024-05-10 VITALS — WEIGHT: 150 LBS | BODY MASS INDEX: 25.61 KG/M2 | HEIGHT: 64 IN

## 2024-05-10 DIAGNOSIS — M48.02 SPINAL STENOSIS, CERVICAL REGION: ICD-10-CM

## 2024-05-10 PROCEDURE — 99204 OFFICE O/P NEW MOD 45 MIN: CPT

## 2024-05-12 NOTE — DATA REVIEWED
[FreeTextEntry1] : On my interpretation of these images  [facility] Cervical MRI 4/20/2024 - C5/6 disc herniation with nerve compression. No cord compression.   [facility] Lumbar MRI 12/2024 - No significant disc herniation or stenosis. L5S1 grade 1 spondylolisthesis with DDD/ facet arthritis  I stop paperwork reviewed Orthopedic progress notes reviewed

## 2024-05-12 NOTE — DISCUSSION/SUMMARY
[de-identified] : Cervical: 53-year-old female with C5/6 disc herniation. We discussed the possibility of overlapping symptoms between cervical issues and carpal tunnel syndrome. A cervical epidural steroid injection was suggested as a therapeutic and diagnostic measure to determine if most of her left upper extremity symptoms are stemming from cervical problems or carpal tunnel syndrome. However, she has not experienced significant relief from night splinting. Continuation HEP. She prefers to delay the injection due to hesitancy about it and because her pain level is tolerable.  Lumbar:  53-year-old female has been diagnosed with L5S1 spondylolisthesis and DDD / facet arthritis. We discussed the importance of continuing lumbar home exercises, especially focusing on core strengthening exercises. Also discussed continuation of acupuncture as this is affording her relief. Her symptoms are not severe enough to warrant lumbar epidural steroid injection / MBBs or surgical intervention like laminectomy/fusion at this time, as they are not functionally incapacitating.  Cumulative encounter duration exceeded 45 minutes. Prior to appointment and during encounter with patient extensive medical records were reviewed including but not limited to, hospital records, outpatient records, imaging results, and lab data. During this appointment the patient was examined, diagnoses were discussed and explained in a face to face manner. In addition extensive time was spent reviewing aforementioned diagnostic studies. Counseling including abnormal image results, differential diagnoses, treatment options, risk and benefits, lifestyle changes, current condition, and current medications was performed. Patient's comments, questions, and concerns were addressed and patient verbalized understanding. Based on this patient's presentation at our office, which is an orthopedic spine surgeon's office, this patient inherently / intrinsically has a risk, however minute, of developing issues such as Cauda equina syndrome, bowel and bladder changes, or progression of motor or neurological deficits such as paralysis which may be permanent.

## 2024-05-12 NOTE — PHYSICAL EXAM
[Normal Coordination] : normal coordination [Normal DTR UE/LE] : normal DTR UE/LE  [Normal Sensation] : normal sensation [Normal Mood and Affect] : normal mood and affect [Oriented] : oriented [Able to Communicate] : able to communicate [Normal Skin] : normal skin [No Rash] : no rash [No Ulcers] : no ulcers [No Lesions] : no lesions [No obvious lymphadenopathy in areas examined] : no obvious lymphadenopathy in areas examined [Well Developed] : well developed [Peripheral vascular exam is grossly normal] : peripheral vascular exam is grossly normal [No Respiratory Distress] : no respiratory distress [Lungs clear to auscultation bilaterally] : lungs clear to auscultation bilaterally [Normal Bowel Sounds] : normal bowel sounds [Non-Tender] : non-tender [No HSM] : no HSM [No Mass] : no mass [Biceps 2+] : biceps 2+ [Triceps 2+] : triceps 2+ [Brachioradialis 2+] : brachioradialis 2+ [Flexion] : flexion [Extension] : extension [] : non-antalgic

## 2024-05-12 NOTE — HISTORY OF PRESENT ILLNESS
[Neck] : neck [Lower back] : lower back [Gradual] : gradual [Dull/Aching] : dull/aching [Intermittent] : intermittent [Household chores] : household chores [Sleep] : sleep [de-identified] : 05/10/2024 - 52 y/o F here for subjective hand weakness. Chronic low back pain, more recent right leg pain Cervical Symptoms: The patient complains of subjective weakness in her hands, particularly in opening objects. Neck pain. She notes that this weakness worsened after her shoulder surgery, which was performed 2 years ago. However, she denies any change in dexterity. She also mentions experiencing mild shooting pain into her left forearm, wrist, and finger, although it does not radiate throughout her entire arm. Sometimes using wrist splints at nighttime. She has undergone a cervical MRI and is here to review the results. Lumbar Symptoms: Chronic lower back pain, more recent right radicular leg pain with recurring flare-ups. She has previously received care from psychiatrists who referred her to physical therapy, which was beneficial at the time. Currently, she is undergoing acupuncture, which she finds helpful. She reports constant low back tightness that she can manage.    [] : no [FreeTextEntry5] : patient has had pain in cervical/lumbar spine for more than 3yrs, no injury no fall Lower back 3/10 Cervical 7/10 [FreeTextEntry7] : Right leg [de-identified] : MRI [facility] [de-identified] : Home exercises Acupuncture

## 2024-06-10 ENCOUNTER — APPOINTMENT (OUTPATIENT)
Dept: ORTHOPEDIC SURGERY | Facility: CLINIC | Age: 54
End: 2024-06-10
Payer: COMMERCIAL

## 2024-06-10 VITALS — HEIGHT: 64 IN | BODY MASS INDEX: 25.61 KG/M2 | WEIGHT: 150 LBS

## 2024-06-10 DIAGNOSIS — M75.52 BURSITIS OF LEFT SHOULDER: ICD-10-CM

## 2024-06-10 DIAGNOSIS — M75.112 INCOMPLETE ROTATOR CUFF TEAR OR RUPTURE OF LEFT SHOULDER, NOT SPECIFIED AS TRAUMATIC: ICD-10-CM

## 2024-06-10 PROCEDURE — 99214 OFFICE O/P EST MOD 30 MIN: CPT

## 2024-06-19 NOTE — IMAGING
[de-identified] : The patient is a well appearing 54 year old female of their stated age. Neck is supple & nontender to palpation. Negative Spurling's test.  LEFT Shoulder Inspection: Scapula Winging: Negative Deformity: None Erythema: None Ecchymosis: None Abrasions: None Effusion: None   Range of Motion: Active Forward Flexion: 160 degrees Passive Forward Flexion: 170 degrees Active IR : L4 Passive ER : 30 degrees   Motor Exam: Forward Flexion: 4+ out of 5 Flexion Plane of Scapula: 5 out of 5 Abduction: 4+ out of 5 Internal Rotation: 5 out of 5 External Rotation: 4+ out of 5 Distal Motor Strength: 5 out of 5   Stability Testing: Anterior: 1+ Posterior: 1+ Sulcus N: 1+ Sulcus ER: 1+   Provocative Tests: Drop Arm: Negative Foster/Impingement: Positive [place]: Positive X-Arm Adduction: Negative Belly Press: Negative Bear Hug: Negative Lift Off: Negative Apprehension: Negative Relocation: Negative Posterior Load & Shift: Negative   Palpation: AC Joint: TTP Clavicle: Nontender SC Joint: Nontender Bicipital Groove: TTP Coracoid Process: Nontender Pectoralis Minor Tendon: Nontender Pectoralis Major Tendon: Nontender & palpably intact Latissimus Dorsi: Nontender Proximal Humerus: Nontender Scapula Body: Nontender Medial Scapula Border: Nontender Scapula Spine: Nontender   Neurologic Exam: Sensation to Light Touch: Axillary: Grossly intact Ulnar: Grossly intact Radial: Grossly intact Median: Grossly intact   Circulatory/Pulses: Ulnar: 2+ Radial: 2+ CR < 2s  X-RAYS of the LEFT shoulder (AP, SCAPULAR Y, AND AXILLARY VIEWS): no fracture or dislocation; Bigliani Type 2 acromion; mild degenerative change at ACJ; subacromial spurring

## 2024-06-19 NOTE — HISTORY OF PRESENT ILLNESS
[de-identified] : The patient is a 54 year old right hand dominant F who presents today complaining of left shoulder.   Date of Injury/Onset: since January 2024 Pain:  At Rest: 1/10  With Activity:  6/10  Quality of symptoms: sharp, aching, radiates to neck Improves with: ice, Aleve Worse with: lifting, pulling and certain arm movements  Prior treatment: PT and acupuncture  Prior Imaging: MRI @ [facility] Additional Information: Patient feels minimal improvement since last visit. Attending PT and acupuncture. ROM improving, but still stiff

## 2024-06-19 NOTE — DISCUSSION/SUMMARY
[de-identified] : Assessment:   The patient presents with history, examination and imaging that are most consistent with a diagnosis of:  Nontraumatic incomplete rotator cuff tear of left shoulder, cervical spine stenosis   The patient would like to pursue conservative measures at this time. After consideration of various non-operative treatment modalities, the patient would like to proceed with the modalities listed below.   During this appointment the patient was examined, diagnoses were discussed and explained in a face to face manner. In addition extensive time was spent reviewing aforementioned diagnostic studies. Counseling including abnormal image results, differential diagnoses, treatment options, risk and benefits, lifestyle changes, current condition, and current medications was performed. Patient's comments, questions, and concerns were address and patient verbalized understanding.   ---------------------------     Plan: - continue with PT and HEP - continue to follow up with neck/cervical spine specialist - discussed injections if pain worsens - take anti-inflammatory as needed - discussed annual MRI to monitor progression    Follow-up: prn for CSI

## 2024-11-08 ENCOUNTER — APPOINTMENT (OUTPATIENT)
Dept: ORTHOPEDIC SURGERY | Facility: CLINIC | Age: 54
End: 2024-11-08

## 2025-01-07 DIAGNOSIS — Z01.419 ENCOUNTER FOR GYNECOLOGICAL EXAMINATION (GENERAL) (ROUTINE) W/OUT ABNORMAL FINDINGS: ICD-10-CM

## 2025-02-11 PROBLEM — Z12.11 COLON CANCER SCREENING: Status: ACTIVE | Noted: 2025-02-11

## 2025-02-18 ENCOUNTER — APPOINTMENT (OUTPATIENT)
Dept: OBGYN | Facility: CLINIC | Age: 55
End: 2025-02-18
Payer: COMMERCIAL

## 2025-02-18 VITALS — HEIGHT: 63 IN | WEIGHT: 150 LBS | BODY MASS INDEX: 26.58 KG/M2

## 2025-02-18 VITALS — SYSTOLIC BLOOD PRESSURE: 120 MMHG | HEART RATE: 87 BPM | DIASTOLIC BLOOD PRESSURE: 79 MMHG

## 2025-02-18 DIAGNOSIS — Z12.11 ENCOUNTER FOR SCREENING FOR MALIGNANT NEOPLASM OF COLON: ICD-10-CM

## 2025-02-18 DIAGNOSIS — Z00.00 ENCOUNTER FOR GENERAL ADULT MEDICAL EXAMINATION W/OUT ABNORMAL FINDINGS: ICD-10-CM

## 2025-02-18 LAB
BILIRUB UR QL STRIP: NEGATIVE
CLARITY UR: CLEAR
COLLECTION METHOD: NORMAL
GLUCOSE UR-MCNC: NEGATIVE
HCG UR QL: 0 EU/DL
HEMOGLOBIN: 12.8
HGB UR QL STRIP.AUTO: NORMAL
KETONES UR-MCNC: NEGATIVE
LEUKOCYTE ESTERASE UR QL STRIP: NEGATIVE
NITRITE UR QL STRIP: NEGATIVE
PH UR STRIP: 6
PROT UR STRIP-MCNC: NEGATIVE
SP GR UR STRIP: 1

## 2025-02-18 PROCEDURE — 81003 URINALYSIS AUTO W/O SCOPE: CPT | Mod: QW

## 2025-02-18 PROCEDURE — 85018 HEMOGLOBIN: CPT | Mod: QW

## 2025-02-18 PROCEDURE — 99396 PREV VISIT EST AGE 40-64: CPT | Mod: 25

## 2025-02-18 PROCEDURE — 82270 OCCULT BLOOD FECES: CPT

## 2025-02-18 RX ORDER — PEDI MULTIVIT NO.12 W-FLUORIDE 0.5 MG
TABLET,CHEWABLE ORAL
Refills: 0 | Status: ACTIVE | COMMUNITY

## 2025-02-25 LAB — CYTOLOGY CVX/VAG DOC THIN PREP: NORMAL

## 2025-02-27 ENCOUNTER — APPOINTMENT (OUTPATIENT)
Dept: ORTHOPEDIC SURGERY | Facility: CLINIC | Age: 55
End: 2025-02-27
Payer: COMMERCIAL

## 2025-02-27 DIAGNOSIS — G56.01 CARPAL TUNNEL SYNDROME, RIGHT UPPER LIMB: ICD-10-CM

## 2025-02-27 DIAGNOSIS — M77.8 OTHER ENTHESOPATHIES, NOT ELSEWHERE CLASSIFIED: ICD-10-CM

## 2025-02-27 DIAGNOSIS — M65.931 UNSPECIFIED SYNOVITIS AND TENOSYNOVITIS, RIGHT FOREARM: ICD-10-CM

## 2025-02-27 PROCEDURE — 99212 OFFICE O/P EST SF 10 MIN: CPT

## 2025-06-04 ENCOUNTER — APPOINTMENT (OUTPATIENT)
Dept: ORTHOPEDIC SURGERY | Facility: CLINIC | Age: 55
End: 2025-06-04

## 2025-06-04 DIAGNOSIS — M17.11 UNILATERAL PRIMARY OSTEOARTHRITIS, RIGHT KNEE: ICD-10-CM

## 2025-06-04 DIAGNOSIS — M22.2X2 PATELLOFEMORAL DISORDERS, LEFT KNEE: ICD-10-CM

## 2025-06-04 DIAGNOSIS — M25.569 PAIN IN UNSPECIFIED KNEE: ICD-10-CM

## 2025-06-04 DIAGNOSIS — M25.561 PAIN IN RIGHT KNEE: ICD-10-CM

## 2025-06-04 PROCEDURE — 99214 OFFICE O/P EST MOD 30 MIN: CPT | Mod: 25

## 2025-06-04 PROCEDURE — 73564 X-RAY EXAM KNEE 4 OR MORE: CPT | Mod: RT

## 2025-06-04 RX ORDER — NAPROXEN 500 MG/1
500 TABLET ORAL
Qty: 30 | Refills: 1 | Status: ACTIVE | COMMUNITY
Start: 2025-06-04 | End: 1900-01-01

## 2025-07-21 ENCOUNTER — APPOINTMENT (OUTPATIENT)
Dept: ORTHOPEDIC SURGERY | Facility: CLINIC | Age: 55
End: 2025-07-21